# Patient Record
Sex: FEMALE | Race: BLACK OR AFRICAN AMERICAN | NOT HISPANIC OR LATINO
[De-identification: names, ages, dates, MRNs, and addresses within clinical notes are randomized per-mention and may not be internally consistent; named-entity substitution may affect disease eponyms.]

---

## 2017-02-06 ENCOUNTER — APPOINTMENT (OUTPATIENT)
Dept: OBGYN | Facility: CLINIC | Age: 42
End: 2017-02-06

## 2017-02-06 VITALS
DIASTOLIC BLOOD PRESSURE: 70 MMHG | HEIGHT: 63 IN | BODY MASS INDEX: 27.46 KG/M2 | WEIGHT: 155 LBS | SYSTOLIC BLOOD PRESSURE: 128 MMHG

## 2017-02-17 LAB — CYTOLOGY CVX/VAG DOC THIN PREP: NORMAL

## 2017-02-27 ENCOUNTER — APPOINTMENT (OUTPATIENT)
Dept: OBGYN | Facility: CLINIC | Age: 42
End: 2017-02-27

## 2017-02-27 VITALS — WEIGHT: 151 LBS | HEIGHT: 63 IN | BODY MASS INDEX: 26.75 KG/M2

## 2017-02-27 DIAGNOSIS — R87.612 LOW GRADE SQUAMOUS INTRAEPITHELIAL LESION ON CYTOLOGIC SMEAR OF CERVIX (LGSIL): ICD-10-CM

## 2017-03-03 LAB — CORE LAB BIOPSY: NORMAL

## 2017-10-26 DIAGNOSIS — Z00.00 ENCOUNTER FOR GENERAL ADULT MEDICAL EXAMINATION W/OUT ABNORMAL FINDINGS: ICD-10-CM

## 2017-10-30 ENCOUNTER — APPOINTMENT (OUTPATIENT)
Dept: OBGYN | Facility: CLINIC | Age: 42
End: 2017-10-30
Payer: COMMERCIAL

## 2017-10-30 DIAGNOSIS — N92.6 IRREGULAR MENSTRUATION, UNSPECIFIED: ICD-10-CM

## 2017-10-30 PROCEDURE — 99213 OFFICE O/P EST LOW 20 MIN: CPT | Mod: 25

## 2017-10-30 PROCEDURE — 36415 COLL VENOUS BLD VENIPUNCTURE: CPT

## 2017-10-30 PROCEDURE — 76830 TRANSVAGINAL US NON-OB: CPT

## 2017-10-31 ENCOUNTER — LABORATORY RESULT (OUTPATIENT)
Age: 42
End: 2017-10-31

## 2017-11-01 ENCOUNTER — APPOINTMENT (OUTPATIENT)
Dept: OBGYN | Facility: CLINIC | Age: 42
End: 2017-11-01
Payer: COMMERCIAL

## 2017-11-01 LAB
ABO + RH PNL BLD: NORMAL
C TRACH RRNA SPEC QL NAA+PROBE: NOT DETECTED
HCG SERPL QL: POSITIVE
N GONORRHOEA RRNA SPEC QL NAA+PROBE: NOT DETECTED
PAPP-A SERPL-ACNC: 271 MIU/ML
PROGEST SERPL-MCNC: 3.7 NG/ML
SOURCE AMPLIFICATION: NORMAL

## 2017-11-01 PROCEDURE — 36415 COLL VENOUS BLD VENIPUNCTURE: CPT

## 2017-11-02 ENCOUNTER — FORM ENCOUNTER (OUTPATIENT)
Age: 42
End: 2017-11-02

## 2017-11-02 LAB — PROGEST SERPL-MCNC: 6.9 NG/ML

## 2017-11-03 ENCOUNTER — APPOINTMENT (OUTPATIENT)
Dept: ULTRASOUND IMAGING | Facility: CLINIC | Age: 42
End: 2017-11-03
Payer: COMMERCIAL

## 2017-11-03 ENCOUNTER — OUTPATIENT (OUTPATIENT)
Dept: OUTPATIENT SERVICES | Facility: HOSPITAL | Age: 42
LOS: 1 days | End: 2017-11-03
Payer: COMMERCIAL

## 2017-11-03 DIAGNOSIS — Z00.8 ENCOUNTER FOR OTHER GENERAL EXAMINATION: ICD-10-CM

## 2017-11-03 PROCEDURE — 76830 TRANSVAGINAL US NON-OB: CPT | Mod: 26

## 2017-11-03 PROCEDURE — 76856 US EXAM PELVIC COMPLETE: CPT | Mod: 26

## 2017-11-03 PROCEDURE — 76830 TRANSVAGINAL US NON-OB: CPT

## 2017-11-03 PROCEDURE — 76856 US EXAM PELVIC COMPLETE: CPT

## 2017-11-07 ENCOUNTER — MOBILE ON CALL (OUTPATIENT)
Age: 42
End: 2017-11-07

## 2017-11-08 ENCOUNTER — APPOINTMENT (OUTPATIENT)
Dept: OBGYN | Facility: CLINIC | Age: 42
End: 2017-11-08

## 2017-11-09 ENCOUNTER — LABORATORY RESULT (OUTPATIENT)
Age: 42
End: 2017-11-09

## 2017-11-09 DIAGNOSIS — O00.90 UNSPECIFIED. ECTOPIC. PREGNANCY WITHOUT INTRAUTERINE PREGNANCY: ICD-10-CM

## 2017-11-09 LAB
HCG SERPL QL: POSITIVE
HCG SERPL QL: POSITIVE
PAPP-A SERPL-ACNC: 187 MIU/ML
PAPP-A SERPL-ACNC: 192 MIU/ML

## 2017-11-12 ENCOUNTER — MOBILE ON CALL (OUTPATIENT)
Age: 42
End: 2017-11-12

## 2017-11-15 ENCOUNTER — OTHER (OUTPATIENT)
Age: 42
End: 2017-11-15

## 2017-11-15 LAB
ALBUMIN SERPL ELPH-MCNC: 4.2 G/DL
ALP BLD-CCNC: 72 U/L
ALT SERPL-CCNC: 10 U/L
ANION GAP SERPL CALC-SCNC: 12 MMOL/L
AST SERPL-CCNC: 16 U/L
BASOPHILS # BLD AUTO: 0.02 K/UL
BASOPHILS NFR BLD AUTO: 0.2 %
BILIRUB SERPL-MCNC: 0.2 MG/DL
BUN SERPL-MCNC: 8 MG/DL
CALCIUM SERPL-MCNC: 9.8 MG/DL
CHLORIDE SERPL-SCNC: 99 MMOL/L
CO2 SERPL-SCNC: 24 MMOL/L
CREAT SERPL-MCNC: 0.88 MG/DL
EOSINOPHIL # BLD AUTO: 0.14 K/UL
EOSINOPHIL NFR BLD AUTO: 1.6 %
GLUCOSE SERPL-MCNC: 95 MG/DL
HCG SERPL QL: POSITIVE
HCT VFR BLD CALC: 36.8 %
HGB BLD-MCNC: 12.3 G/DL
IMM GRANULOCYTES NFR BLD AUTO: 0.2 %
LYMPHOCYTES # BLD AUTO: 3.65 K/UL
LYMPHOCYTES NFR BLD AUTO: 40.7 %
MAN DIFF?: NORMAL
MCHC RBC-ENTMCNC: 28 PG
MCHC RBC-ENTMCNC: 33.4 GM/DL
MCV RBC AUTO: 83.6 FL
MONOCYTES # BLD AUTO: 0.4 K/UL
MONOCYTES NFR BLD AUTO: 4.5 %
NEUTROPHILS # BLD AUTO: 4.73 K/UL
NEUTROPHILS NFR BLD AUTO: 52.8 %
PAPP-A SERPL-ACNC: 64 MIU/ML
PLATELET # BLD AUTO: 312 K/UL
POTASSIUM SERPL-SCNC: 3.8 MMOL/L
PROT SERPL-MCNC: 7.6 G/DL
RBC # BLD: 4.4 M/UL
RBC # FLD: 15.3 %
SODIUM SERPL-SCNC: 135 MMOL/L
WBC # FLD AUTO: 8.96 K/UL

## 2017-11-16 LAB
BASOPHILS # BLD AUTO: 0.02 K/UL
BASOPHILS NFR BLD AUTO: 0.3 %
EOSINOPHIL # BLD AUTO: 0.1 K/UL
EOSINOPHIL NFR BLD AUTO: 1.5 %
HCT VFR BLD CALC: 36.5 %
HGB BLD-MCNC: 12 G/DL
IMM GRANULOCYTES NFR BLD AUTO: 0.3 %
LYMPHOCYTES # BLD AUTO: 2.13 K/UL
LYMPHOCYTES NFR BLD AUTO: 32.8 %
MAN DIFF?: NORMAL
MCHC RBC-ENTMCNC: 27.7 PG
MCHC RBC-ENTMCNC: 32.9 GM/DL
MCV RBC AUTO: 84.3 FL
MONOCYTES # BLD AUTO: 0.38 K/UL
MONOCYTES NFR BLD AUTO: 5.9 %
NEUTROPHILS # BLD AUTO: 3.84 K/UL
NEUTROPHILS NFR BLD AUTO: 59.2 %
PLATELET # BLD AUTO: 305 K/UL
RBC # BLD: 4.33 M/UL
RBC # FLD: 15.4 %
WBC # FLD AUTO: 6.49 K/UL

## 2017-11-17 LAB
HCG SERPL QL: POSITIVE
PAPP-A SERPL-ACNC: 29 MIU/ML

## 2017-11-24 ENCOUNTER — MOBILE ON CALL (OUTPATIENT)
Age: 42
End: 2017-11-24

## 2017-11-27 LAB
BASOPHILS # BLD AUTO: 0.02 K/UL
BASOPHILS NFR BLD AUTO: 0.2 %
EOSINOPHIL # BLD AUTO: 0.17 K/UL
EOSINOPHIL NFR BLD AUTO: 1.9 %
HCG SERPL QL: NORMAL
HCT VFR BLD CALC: 38.3 %
HGB BLD-MCNC: 12.3 G/DL
IMM GRANULOCYTES NFR BLD AUTO: 0.3 %
LYMPHOCYTES # BLD AUTO: 2.88 K/UL
LYMPHOCYTES NFR BLD AUTO: 32.6 %
MAN DIFF?: NORMAL
MCHC RBC-ENTMCNC: 32.1 GM/DL
MCV RBC AUTO: 85.7 FL
MONOCYTES # BLD AUTO: 0.59 K/UL
MONOCYTES NFR BLD AUTO: 6.7 %
NEUTROPHILS # BLD AUTO: 5.14 K/UL
NEUTROPHILS NFR BLD AUTO: 58.3 %
PAPP-A SERPL-ACNC: 5 MIU/ML
RBC # FLD: 15.8 %
WBC # FLD AUTO: 8.83 K/UL

## 2019-04-22 ENCOUNTER — APPOINTMENT (OUTPATIENT)
Dept: OBGYN | Facility: CLINIC | Age: 44
End: 2019-04-22
Payer: COMMERCIAL

## 2019-04-22 VITALS
DIASTOLIC BLOOD PRESSURE: 80 MMHG | HEIGHT: 63 IN | WEIGHT: 161 LBS | SYSTOLIC BLOOD PRESSURE: 140 MMHG | BODY MASS INDEX: 28.53 KG/M2

## 2019-04-22 PROCEDURE — 99396 PREV VISIT EST AGE 40-64: CPT

## 2019-04-22 NOTE — PHYSICAL EXAM
[Awake] : awake [Acute Distress] : no acute distress [Alert] : alert [LAD] : no lymphadenopathy [Thyroid Nodule] : no thyroid nodule [Mass] : no breast mass [Goiter] : no goiter [Nipple Discharge] : no nipple discharge [Axillary LAD] : no axillary lymphadenopathy [Soft] : soft [Tender] : non tender [Oriented x3] : oriented to person, place, and time [Normal] : uterus [No Bleeding] : there was no active vaginal bleeding [Uterine Adnexae] : were not tender and not enlarged [RRR, No Murmurs] : RRR, no murmurs [CTAB] : CTAB

## 2019-04-22 NOTE — PROCEDURE
[Cervical Pap Smear] : cervical Pap smear [No Complications] : there were no complications [Liquid Base] : liquid base [Tolerated Well] : the patient tolerated the procedure well

## 2019-04-22 NOTE — HISTORY OF PRESENT ILLNESS
[Last Mammogram ___] : Last Mammogram was [unfilled] [Last Pap ___] : Last cervical pap smear was [unfilled] [Reproductive Age] : is of reproductive age [Definite:  ___ (Date)] : the last menstrual period was [unfilled]

## 2019-04-25 LAB — CYTOLOGY CVX/VAG DOC THIN PREP: NORMAL

## 2019-05-12 ENCOUNTER — MOBILE ON CALL (OUTPATIENT)
Age: 44
End: 2019-05-12

## 2019-05-13 LAB
DHEA-S SERPL-MCNC: 67.8 UG/DL
ESTRADIOL SERPL-MCNC: 49 PG/ML
FSH SERPL-MCNC: 6.7 IU/L
HCG SERPL QL: NEGATIVE
LH SERPL-ACNC: 7.2 IU/L
PAPP-A SERPL-ACNC: <1 MIU/ML
PROGEST SERPL-MCNC: 0.2 NG/ML
PROLACTIN SERPL-MCNC: 21.3 NG/ML
TESTOST SERPL-MCNC: 5.9 NG/DL
TSH SERPL-ACNC: 1.42 UIU/ML

## 2020-06-08 ENCOUNTER — APPOINTMENT (OUTPATIENT)
Dept: OBGYN | Facility: CLINIC | Age: 45
End: 2020-06-08

## 2020-09-14 ENCOUNTER — APPOINTMENT (OUTPATIENT)
Dept: OBGYN | Facility: CLINIC | Age: 45
End: 2020-09-14
Payer: COMMERCIAL

## 2020-09-14 VITALS
BODY MASS INDEX: 27.64 KG/M2 | TEMPERATURE: 97.8 F | DIASTOLIC BLOOD PRESSURE: 72 MMHG | WEIGHT: 156 LBS | HEIGHT: 63 IN | SYSTOLIC BLOOD PRESSURE: 140 MMHG

## 2020-09-14 DIAGNOSIS — Z01.419 ENCOUNTER FOR GYNECOLOGICAL EXAMINATION (GENERAL) (ROUTINE) W/OUT ABNORMAL FINDINGS: ICD-10-CM

## 2020-09-14 PROCEDURE — 99396 PREV VISIT EST AGE 40-64: CPT

## 2020-09-14 PROCEDURE — 81003 URINALYSIS AUTO W/O SCOPE: CPT | Mod: QW

## 2020-09-14 NOTE — PHYSICAL EXAM
[Appropriately responsive] : appropriately responsive [Alert] : alert [No Acute Distress] : no acute distress [Regular Rate Rhythm] : regular rate rhythm [No Lymphadenopathy] : no lymphadenopathy [Clear to Auscultation B/L] : clear to auscultation bilaterally [No Murmurs] : no murmurs [Non-tender] : non-tender [Soft] : soft [No HSM] : No HSM [Non-distended] : non-distended [No Mass] : no mass [No Lesions] : no lesions [Oriented x3] : oriented x3 [Examination Of The Breasts] : a normal appearance [No Masses] : no breast masses were palpable [Labia Minora] : normal [Labia Majora] : normal [Normal] : normal [Uterine Adnexae] : normal

## 2020-09-16 LAB — BACTERIA UR CULT: NORMAL

## 2020-09-21 LAB — CYTOLOGY CVX/VAG DOC THIN PREP: NORMAL

## 2020-10-28 ENCOUNTER — APPOINTMENT (OUTPATIENT)
Dept: UROLOGY | Facility: CLINIC | Age: 45
End: 2020-10-28

## 2020-12-22 ENCOUNTER — APPOINTMENT (OUTPATIENT)
Dept: UROLOGY | Facility: CLINIC | Age: 45
End: 2020-12-22

## 2021-10-04 ENCOUNTER — APPOINTMENT (OUTPATIENT)
Dept: OBGYN | Facility: CLINIC | Age: 46
End: 2021-10-04
Payer: COMMERCIAL

## 2021-10-04 VITALS
SYSTOLIC BLOOD PRESSURE: 148 MMHG | DIASTOLIC BLOOD PRESSURE: 86 MMHG | BODY MASS INDEX: 27.64 KG/M2 | WEIGHT: 156 LBS | HEIGHT: 63 IN

## 2021-10-04 PROCEDURE — 99396 PREV VISIT EST AGE 40-64: CPT

## 2021-10-04 NOTE — HISTORY OF PRESENT ILLNESS
[FreeTextEntry1] : patient presents today for routine annual exam.\par  [TextBox_4] : frequency and urgency  [Mammogramdate] : 10/2020 [BreastSonogramDate] : 10/2020 [PapSmeardate] : 9/2020 [LMPDate] : 9/25/2021

## 2021-10-11 LAB — CYTOLOGY CVX/VAG DOC THIN PREP: NORMAL

## 2022-04-01 ENCOUNTER — APPOINTMENT (OUTPATIENT)
Dept: UROLOGY | Facility: CLINIC | Age: 47
End: 2022-04-01
Payer: COMMERCIAL

## 2022-04-01 VITALS
SYSTOLIC BLOOD PRESSURE: 124 MMHG | HEIGHT: 63 IN | BODY MASS INDEX: 28.35 KG/M2 | WEIGHT: 160 LBS | HEART RATE: 85 BPM | DIASTOLIC BLOOD PRESSURE: 85 MMHG

## 2022-04-01 DIAGNOSIS — R39.15 URGENCY OF URINATION: ICD-10-CM

## 2022-04-01 DIAGNOSIS — R35.0 FREQUENCY OF MICTURITION: ICD-10-CM

## 2022-04-01 DIAGNOSIS — K59.09 OTHER CONSTIPATION: ICD-10-CM

## 2022-04-01 PROCEDURE — 51798 US URINE CAPACITY MEASURE: CPT

## 2022-04-01 PROCEDURE — 99204 OFFICE O/P NEW MOD 45 MIN: CPT

## 2022-04-01 RX ORDER — METHOTREXATE SODIUM 25 MG/ML
250 INJECTION, SOLUTION INTRA-ARTERIAL; INTRAMUSCULAR; INTRAVENOUS ONCE
Qty: 10 | Refills: 0 | Status: COMPLETED | COMMUNITY
Start: 2017-11-09 | End: 2022-04-01

## 2022-04-01 RX ORDER — AMLODIPINE BESYLATE 5 MG/1
5 TABLET ORAL
Refills: 0 | Status: COMPLETED | COMMUNITY
End: 2022-04-01

## 2022-04-01 RX ORDER — VITAMIN A, ASCORBIC ACID, CHOLECALCIFEROL, .ALPHA.-TOCOPHEROL ACETATE, DL-, THIAMINE MONONITRATE, RIBOFLAVIN, NIACINAMIDE, PYRIDOXINE HYDROCHLORIDE, FOLIC ACID, CYANOCOBALAMIN, CALCIUM CARBONATE, IRON, ZINC OXIDE, AND CUPRIC OXIDE 4000; 120; 400; 22; 1.84; 3; 20; 10; 1; 12; 200; 29; 25; 2 [IU]/1; MG/1; [IU]/1; [IU]/1; MG/1; MG/1; MG/1; MG/1; MG/1; UG/1; MG/1; MG/1; MG/1; MG/1
29-1 TABLET ORAL
Qty: 90 | Refills: 3 | Status: COMPLETED | COMMUNITY
Start: 2017-02-06 | End: 2022-04-01

## 2022-04-01 RX ORDER — METOPROLOL TARTRATE 25 MG/1
25 TABLET, FILM COATED ORAL
Refills: 0 | Status: ACTIVE | COMMUNITY

## 2022-04-01 NOTE — PHYSICAL EXAM
[General Appearance - Well Developed] : well developed [General Appearance - Well Nourished] : well nourished [Normal Appearance] : normal appearance [Well Groomed] : well groomed [General Appearance - In No Acute Distress] : no acute distress [Edema] : no peripheral edema [Respiration, Rhythm And Depth] : normal respiratory rhythm and effort [Exaggerated Use Of Accessory Muscles For Inspiration] : no accessory muscle use [Abdomen Soft] : soft [Abdomen Tenderness] : non-tender [Costovertebral Angle Tenderness] : no ~M costovertebral angle tenderness [Urinary Bladder Findings] : the bladder was normal on palpation [Normal Station and Gait] : the gait and station were normal for the patient's age [] : no rash [Oriented To Time, Place, And Person] : oriented to person, place, and time [Affect] : the affect was normal [Mood] : the mood was normal [Not Anxious] : not anxious

## 2022-04-05 LAB
APPEARANCE: ABNORMAL
BACTERIA UR CULT: NORMAL
BACTERIA: NEGATIVE
BILIRUBIN URINE: NEGATIVE
BLOOD URINE: NEGATIVE
COLOR: NORMAL
GLUCOSE QUALITATIVE U: NEGATIVE
HYALINE CASTS: 0 /LPF
KETONES URINE: NEGATIVE
LEUKOCYTE ESTERASE URINE: NEGATIVE
MICROSCOPIC-UA: NORMAL
NITRITE URINE: NEGATIVE
PH URINE: 8.5
PROTEIN URINE: NORMAL
RED BLOOD CELLS URINE: 2 /HPF
SPECIFIC GRAVITY URINE: 1.02
SQUAMOUS EPITHELIAL CELLS: 1 /HPF
UROBILINOGEN URINE: NORMAL
WHITE BLOOD CELLS URINE: 0 /HPF

## 2022-04-06 NOTE — HISTORY OF PRESENT ILLNESS
[FreeTextEntry1] : 47yo G 3 P 1  1,1 miscarriage , 1   woman with a year old hx of urinary frequency,urgency and naive to therapy. Voids with increased urgency and frequency< 1 hour post drinking of fluids. She thinks her bladder capacity has decreased since past year. Voids q 15-30 mins in 30 mins  after drinking water and nocturia X 2-3 if she drank fluids 2 -3 hours prior bedtime. If she did not drink fluids - she could hold for q 1 h . 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 Reports constipation for  5/7 days, takes prune juice PRN and Miralax if that does' not work. . Denies UTI, denies gross hematuria. Denies Kidney stones hx. No renal imaging on file.Denies stressors , was undergoing stress last year. PVR -0 ml \par

## 2022-04-06 NOTE — ASSESSMENT
[FreeTextEntry1] : 46  y.o woman with OAB- urinary frequency, urgency , naive to therapy / medications \par --Life style behavioral modifications reviewed - Fluids management - Drink 48 -64  oz of water as long as no medical contraindications, refrain from drinking fluids 3 hours before  bedtime. \par --Complete a voiding diary to assess fluid intake and voided volume as well as if any urinary leakage and type and amount.\par --Address constipation  with fiber intake - fruits and vegetable servings 3-5 per day, use of daily Gummy fibers supplements if needed .\par --UA microscopic analysis, Culture- call with results.\par --Trial of Oxybutynin ER 5  mg po daily and side effects reviewed. \par --RTO in 1  month \par

## 2022-05-06 ENCOUNTER — APPOINTMENT (OUTPATIENT)
Dept: UROLOGY | Facility: CLINIC | Age: 47
End: 2022-05-06

## 2022-08-02 ENCOUNTER — APPOINTMENT (OUTPATIENT)
Dept: OBGYN | Facility: CLINIC | Age: 47
End: 2022-08-02

## 2022-08-02 VITALS — BODY MASS INDEX: 27.99 KG/M2 | DIASTOLIC BLOOD PRESSURE: 90 MMHG | WEIGHT: 158 LBS | SYSTOLIC BLOOD PRESSURE: 130 MMHG

## 2022-08-02 DIAGNOSIS — K31.84 GASTROPARESIS: ICD-10-CM

## 2022-08-02 DIAGNOSIS — Z83.3 FAMILY HISTORY OF DIABETES MELLITUS: ICD-10-CM

## 2022-08-02 DIAGNOSIS — Z78.9 OTHER SPECIFIED HEALTH STATUS: ICD-10-CM

## 2022-08-02 DIAGNOSIS — Z87.19 PERSONAL HISTORY OF OTHER DISEASES OF THE DIGESTIVE SYSTEM: ICD-10-CM

## 2022-08-02 DIAGNOSIS — Z86.79 PERSONAL HISTORY OF OTHER DISEASES OF THE CIRCULATORY SYSTEM: ICD-10-CM

## 2022-08-02 PROCEDURE — 81025 URINE PREGNANCY TEST: CPT

## 2022-08-02 PROCEDURE — 99213 OFFICE O/P EST LOW 20 MIN: CPT | Mod: 25

## 2022-08-02 PROCEDURE — 58100 BIOPSY OF UTERUS LINING: CPT

## 2022-08-02 RX ORDER — MULTIVITAMIN
TABLET ORAL
Refills: 0 | Status: DISCONTINUED | COMMUNITY
End: 2022-08-02

## 2022-08-02 RX ORDER — OXYBUTYNIN CHLORIDE 5 MG/1
5 TABLET, EXTENDED RELEASE ORAL DAILY
Qty: 30 | Refills: 3 | Status: DISCONTINUED | COMMUNITY
Start: 2022-04-01 | End: 2022-08-02

## 2022-08-04 LAB — HCG UR QL: NEGATIVE

## 2022-10-17 ENCOUNTER — APPOINTMENT (OUTPATIENT)
Dept: ORTHOPEDIC SURGERY | Facility: CLINIC | Age: 47
End: 2022-10-17

## 2022-10-17 VITALS — HEIGHT: 63 IN | WEIGHT: 158 LBS | BODY MASS INDEX: 28 KG/M2

## 2022-10-17 DIAGNOSIS — Z78.9 OTHER SPECIFIED HEALTH STATUS: ICD-10-CM

## 2022-10-17 PROCEDURE — 99203 OFFICE O/P NEW LOW 30 MIN: CPT

## 2022-10-17 PROCEDURE — 72050 X-RAY EXAM NECK SPINE 4/5VWS: CPT
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
## 2022-10-17 NOTE — IMAGING
[Straightening consistent with spasm] : Straightening consistent with spasm [Disc space narrowing] : Disc space narrowing [FreeTextEntry1] : 5/6, 6/7 with anterior spurs

## 2022-10-17 NOTE — PHYSICAL EXAM
[] : paracervical tenderness [Flexion] : flexion [Rotation to right] : rotation to right [de-identified] : 4+/5 bilaterally

## 2022-10-17 NOTE — ASSESSMENT
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 Will get updated mri for further evaluation. \par In the interim recommend MDP. \par BHARAT vs surgical intervention pending MRI

## 2022-10-20 ENCOUNTER — APPOINTMENT (OUTPATIENT)
Dept: MRI IMAGING | Facility: CLINIC | Age: 47
End: 2022-10-20

## 2022-10-20 PROCEDURE — 72141 MRI NECK SPINE W/O DYE: CPT

## 2022-10-27 ENCOUNTER — APPOINTMENT (OUTPATIENT)
Dept: ORTHOPEDIC SURGERY | Facility: CLINIC | Age: 47
End: 2022-10-27

## 2022-10-31 ENCOUNTER — APPOINTMENT (OUTPATIENT)
Dept: ORTHOPEDIC SURGERY | Facility: CLINIC | Age: 47
End: 2022-10-31

## 2022-10-31 PROCEDURE — 99213 OFFICE O/P EST LOW 20 MIN: CPT

## 2022-10-31 NOTE — ASSESSMENT
[FreeTextEntry1] : 48 y/o F with h/o C6/7 HNP with worsening symptoms. With C6-7 Right HNP\par Pain management referral\par If pain persist would consider C6-7  ACDF
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
## 2022-10-31 NOTE — IMAGING
[Straightening consistent with spasm] : Straightening consistent with spasm [Disc space narrowing] : Disc space narrowing [de-identified] : ALEX cervical spine reviewed.  Stable C6-7 disc herniation worse on right.  [FreeTextEntry1] : 5/6, 6/7 with anterior spurs

## 2022-10-31 NOTE — PHYSICAL EXAM
[] : paracervical tenderness [Flexion] : flexion [Rotation to right] : rotation to right [de-identified] : 4+/5 bilaterally

## 2023-05-18 ENCOUNTER — EMERGENCY (EMERGENCY)
Facility: HOSPITAL | Age: 48
LOS: 0 days | Discharge: HOME | End: 2023-05-18
Attending: EMERGENCY MEDICINE
Payer: COMMERCIAL

## 2023-05-18 VITALS
OXYGEN SATURATION: 99 % | TEMPERATURE: 99 F | WEIGHT: 154.98 LBS | HEART RATE: 83 BPM | DIASTOLIC BLOOD PRESSURE: 100 MMHG | HEIGHT: 63 IN | SYSTOLIC BLOOD PRESSURE: 164 MMHG | RESPIRATION RATE: 20 BRPM
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
## 2023-05-18 DIAGNOSIS — I10 ESSENTIAL (PRIMARY) HYPERTENSION: ICD-10-CM

## 2023-05-18 DIAGNOSIS — S09.90XA UNSPECIFIED INJURY OF HEAD, INITIAL ENCOUNTER: ICD-10-CM
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
## 2023-05-18 DIAGNOSIS — G44.319 ACUTE POST-TRAUMATIC HEADACHE, NOT INTRACTABLE: ICD-10-CM

## 2023-05-18 DIAGNOSIS — R11.0 NAUSEA: ICD-10-CM

## 2023-05-18 PROCEDURE — 70450 CT HEAD/BRAIN W/O DYE: CPT | Mod: MA

## 2023-05-18 PROCEDURE — 70450 CT HEAD/BRAIN W/O DYE: CPT | Mod: 26,MA

## 2023-05-18 PROCEDURE — 99284 EMERGENCY DEPT VISIT MOD MDM: CPT | Mod: 25

## 2023-05-18 PROCEDURE — 99285 EMERGENCY DEPT VISIT HI MDM: CPT

## 2023-05-18 PROCEDURE — 72125 CT NECK SPINE W/O DYE: CPT | Mod: 26,MA

## 2023-05-18 PROCEDURE — 72125 CT NECK SPINE W/O DYE: CPT | Mod: MA

## 2023-05-18 RX ORDER — DIAZEPAM 5 MG
5 TABLET ORAL ONCE
Refills: 0 | Status: DISCONTINUED | OUTPATIENT
Start: 2023-05-18 | End: 2023-05-18

## 2023-05-18 RX ORDER — ONDANSETRON 8 MG/1
4 TABLET, FILM COATED ORAL ONCE
Refills: 0 | Status: DISCONTINUED | OUTPATIENT
Start: 2023-05-18 | End: 2023-05-18

## 2023-05-18 RX ORDER — ACETAMINOPHEN 500 MG
650 TABLET ORAL ONCE
Refills: 0 | Status: COMPLETED | OUTPATIENT
Start: 2023-05-18 | End: 2023-05-18

## 2023-05-18 RX ORDER — IBUPROFEN 200 MG
600 TABLET ORAL ONCE
Refills: 0 | Status: COMPLETED | OUTPATIENT
Start: 2023-05-18 | End: 2023-05-18

## 2023-05-18 RX ORDER — ONDANSETRON 8 MG/1
4 TABLET, FILM COATED ORAL ONCE
Refills: 0 | Status: COMPLETED | OUTPATIENT
Start: 2023-05-18 | End: 2023-05-18

## 2023-05-18 RX ADMIN — Medication 600 MILLIGRAM(S): at 09:48

## 2023-05-18 RX ADMIN — ONDANSETRON 4 MILLIGRAM(S): 8 TABLET, FILM COATED ORAL at 08:20

## 2023-05-18 RX ADMIN — Medication 5 MILLIGRAM(S): at 08:20

## 2023-05-18 RX ADMIN — Medication 650 MILLIGRAM(S): at 08:19

## 2023-05-18 NOTE — ED PROVIDER NOTE - NSFOLLOWUPINSTRUCTIONS_ED_ALL_ED_FT
-You were seen in the Emergency Department (ED) for fall. Lab and imaging results, if performed, were discussed with you along with your discharge diagnosis.    FOLLOW-UP:  -Please follow up with your PMD if symptoms return or for any concerning matter pertaining to your fall.  -Please follow up with your private physician within the next 72 hours. Tell them you were recently in the ED for an urgent issue and would like to be seen. Bring copies of your results if you were given.   -If you do not have a PMD, please call [phone] to find one convenient for you or call our clinic at [phone].    MEDICATIONS:  -Continue all other prescribed medicine, IF ANY, as per your primary care doctor's (PMD) recommendations.    PAIN CONTROL:  -Please take over the counter Tylenol (also known as acetaminophen) 650mg every 6 hours or Ibuprofen (also known as motrin, advil) 600mg every 8 hour for your pain, IF ANY, unless you are not supposed to for any reason.  -Rest, stay hydrated with plenty of fluids (drink at least 2 Liters or 64 Ounces of water each day UNLESS you are supposed to restrict fluids or ANY reason.    RETURN PRECAUTIONS:  -Please return to the Emergency Department if you experience ANY new or concerning symptoms, such as, but not limited to: worsening pain, large amount of bleeding, passing out, fever >100.F, shaking chills, inability to see or new double vision, chest pain, difficulty breathing, diffuse abdominal pain, unable to eat or drink, continuous vomiting or diarrhea, unable to move or feel part of your body

## 2023-05-18 NOTE — ED PROVIDER NOTE - PATIENT PORTAL LINK FT
You can access the FollowMyHealth Patient Portal offered by [facility] by registering at the following website: [email or web address]. By joining [facility]’s FollowMyHealth portal, you will also be able to view your health information using other applications (apps) compatible with our system.

## 2023-05-18 NOTE — ED PROVIDER NOTE - CLINICAL SUMMARY MEDICAL DECISION MAKING FREE TEXT BOX
47-year-old female with a past medical history of HTN presents to the ED status post mechanical trip and fall from a ground level. Currently endorsing headache at bedside without nausea or vomiting.  No LOC.  Initial vitals reviewed and otherwise nonactionable.  Afebrile satting 100% on room air.  Blood pressure 160 systolic, mildly hypertensive otherwise nonactionable.  Physical exam as noted above.  Otherwise well-appearing female without any acute distress.  Head is atraumatic and normocephalic.  No midline spinal tenderness throughout the cervical thoracic and lumbar spine.  No gross deformities noted.  Able to move all extremities without difficulty with 5 out of 5 strength.  Abdomen is soft nontender without guarding or rebound tenderness.  Differential diagnosis includes but not limited to traumatic injuries.  Will order meds, imaging, and reassess. 47-year-old female with a past medical history of HTN presents to the ED status post mechanical trip and fall from a ground level. Currently endorsing headache at bedside without nausea or vomiting.  No LOC.  Initial vitals reviewed and otherwise nonactionable.  Afebrile satting 100% on room air.  Blood pressure 160 systolic, mildly hypertensive otherwise nonactionable.  Physical exam as noted above.  Otherwise well-appearing female without any acute distress.  Head is atraumatic and normocephalic.  No midline spinal tenderness throughout the cervical thoracic and lumbar spine.  No gross deformities noted.  Able to move all extremities without difficulty with 5 out of 5 strength.  Abdomen is soft nontender without guarding or rebound tenderness.  Differential diagnosis includes but not limited to traumatic injuries.  Will order meds, imaging, and reassess.    Dr. Barone: I performed a face to face bedside interview with patient regarding history of present illness, review of symptoms and past medical history. I completed an independent physical exam.  I have discussed patient's plan of care with resident.   I agree with note as stated above, having amended the EMR as needed to reflect my findings.   This includes HISTORY OF PRESENT ILLNESS, HIV, PAST MEDICAL/SURGICAL/FAMILY/SOCIAL HISTORY, ALLERGIES AND HOME MEDICATIONS, REVIEW OF SYSTEMS, PHYSICAL EXAM, and any PROGRESS NOTES during the time I functioned as the attending physician for this patient.
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
## 2023-05-18 NOTE — ED PROVIDER NOTE - OBJECTIVE STATEMENT
47-year-old female with a past medical history of HTN presents to the ED status post mechanical trip and fall from a ground level.  Patient denies any LOC.  No prodromal symptoms to precipitate the fall.  Usual state of health prior to fall today.  She endorses headache that started post fall.  Headache is described as a pressure-like sensation rated as moderate in severity.  She endorses mild nausea without vomiting.  Remembers everything leading up to the event, was able to ambulate after the fall.  No seizures.  She denies any chest pain, shortness of breath, abdominal pain, urinary symptoms, flank pain.  She denies any other symptoms at bedside.

## 2023-05-18 NOTE — ED PROVIDER NOTE - ATTENDING APP SHARED VISIT CONTRIBUTION OF CARE
Dr. Barone: I performed a face to face bedside interview with patient regarding history of present illness, review of symptoms and past medical history. I completed an independent physical exam.  I have discussed patient's plan of care with PA.   I agree with note as stated above, having amended the EMR as needed to reflect my findings.   This includes HISTORY OF PRESENT ILLNESS, HIV, PAST MEDICAL/SURGICAL/FAMILY/SOCIAL HISTORY, ALLERGIES AND HOME MEDICATIONS, REVIEW OF SYSTEMS, PHYSICAL EXAM, and any PROGRESS NOTES during the time I functioned as the attending physician for this patient.  ERICKA Barone DO

## 2023-05-18 NOTE — ED PROVIDER NOTE - PROGRESS NOTE DETAILS
Patient reassessed at this time, symptoms improved from initial evaluation.  CT reviewed and otherwise nonactionable.  There is no intracranial pathology noted.  Given the timeframe of fall to CT scan was less than 6 hours, there is no evidence of subarachnoid hemorrhage.  Patient instructed follow-up with PMD regarding further evaluation and management.  Strict return precautions related patient.  All questions were answered.
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
## 2023-05-18 NOTE — ED ADULT TRIAGE NOTE - TEMPERATURE IN CELSIUS (DEGREES C)

## 2023-05-18 NOTE — ED PROVIDER NOTE - ATTENDING CONTRIBUTION TO CARE
Dr. Barone: I performed a face to face bedside interview with patient regarding history of present illness, review of symptoms and past medical history. I completed an independent physical exam.  I have discussed patient's plan of care with res.   I agree with note as stated above, having amended the EMR as needed to reflect my findings.   This includes HISTORY OF PRESENT ILLNESS, HIV, PAST MEDICAL/SURGICAL/FAMILY/SOCIAL HISTORY, ALLERGIES AND HOME MEDICATIONS, REVIEW OF SYSTEMS, PHYSICAL EXAM, and any PROGRESS NOTES during the time I functioned as the attending physician for this patient.   Gen:  Well appearning in NAD  Head:  NC/AT  Resp: No distress   Ext: no deformities  Skin: warm and dry as visualized  ERICKA Barone DO Dr. Barone: I performed a face to face bedside interview with patient regarding history of present illness, review of symptoms and past medical history. I completed an independent physical exam.  I have discussed patient's plan of care with resident.   I agree with note as stated above, having amended the EMR as needed to reflect my findings.   This includes HISTORY OF PRESENT ILLNESS, HIV, PAST MEDICAL/SURGICAL/FAMILY/SOCIAL HISTORY, ALLERGIES AND HOME MEDICATIONS, REVIEW OF SYSTEMS, PHYSICAL EXAM, and any PROGRESS NOTES during the time I functioned as the attending physician for this patient.   Gen:  Well appearning in NAD  Head:  NC/AT  Resp: No distress   Ext: no deformities  Skin: warm and dry as visualized  ERICKA Barone DO

## 2023-05-18 NOTE — ED ADULT NURSE NOTE - OBJECTIVE STATEMENT
pt presents to ed s/p mechanical fall outside of a store with head strike and no LOC. no other complaints

## 2023-05-18 NOTE — ED PROVIDER NOTE - NS ED ATTENDING STATEMENT MOD
I have seen and examined this patient and fully participated in the care of this patient as the teaching attending.  The service was shared with the ARIE.  I reviewed and verified the documentation and independently performed the documented: This was a shared visit with the ARIE. I reviewed and verified the documentation and independently performed the documented:

## 2023-05-22 PROBLEM — I10 ESSENTIAL (PRIMARY) HYPERTENSION: Chronic | Status: ACTIVE | Noted: 2023-05-18

## 2023-05-23 ENCOUNTER — APPOINTMENT (OUTPATIENT)
Dept: ORTHOPEDIC SURGERY | Facility: CLINIC | Age: 48
End: 2023-05-23
Payer: COMMERCIAL

## 2023-05-23 VITALS — HEIGHT: 63 IN | WEIGHT: 158 LBS | BODY MASS INDEX: 28 KG/M2

## 2023-05-23 DIAGNOSIS — S46.011A STRAIN OF MUSCLE(S) AND TENDON(S) OF THE ROTATOR CUFF OF RIGHT SHOULDER, INITIAL ENCOUNTER: ICD-10-CM

## 2023-05-23 DIAGNOSIS — M79.18 MYALGIA, OTHER SITE: ICD-10-CM

## 2023-05-23 PROCEDURE — 99214 OFFICE O/P EST MOD 30 MIN: CPT

## 2023-05-23 PROCEDURE — 72040 X-RAY EXAM NECK SPINE 2-3 VW: CPT

## 2023-05-23 PROCEDURE — 73030 X-RAY EXAM OF SHOULDER: CPT | Mod: 50

## 2023-05-23 PROCEDURE — 73010 X-RAY EXAM OF SHOULDER BLADE: CPT | Mod: 50

## 2023-05-23 NOTE — IMAGING
[de-identified] : NECK:\par Inspection: no ecchymosis. \par Palpation: trapezial tenderness. \par Range of motion:  Full range of motion with mild stiffness . Pain at extremes of rotation to right. \par Strength Testing: Weakness with Right Finger Abductors and Grasp\par Normal Deltoid, Biceps, Triceps, Wrist Flexors\par Neurological testing: light touch is intact throughout both upper extremities\par Solis reflex: neg\par Spurling test: positive\par \par \par \par RIGHT  SHOULDER\par Inspection: No swelling. \par Palpation: Tenderness is noted at the bicipital groove, anterior and lateral. \par Range of motion: There is pain with range of motion.\par , ER 55, @90ER 90, @90IR 30\par Strength: There is pain, weakness, and discomfort with strength testing.\par Forward Flexion 3/5. Abduction 3/5.  External Rotation 4/5 and Internal Rotation 5-/5 \par Neurological testings: motor and sensor intact distally.\par Ligament Stability and Special Tests: \par There is positive arc of pain. \par Shoulder apprehension: neg\par Shoulder relocation: neg\par Dumont’s test: pos\par Biceps Active test: neg\par Delgado Labral Shear: neg\par Impingement testing: pos\par Ivet testing: pos\par Whipple: pos\par Cross Body Adduction: neg\par \par LEFT SHOULDER\par Inspection: No swelling. \par Palpation: Tenderness is noted at the bicipital groove, anterior and lateral. \par Range of motion: There is pain with range of motion.\par , ER 55, @90ER 90, @90IR 30\par Strength: There is pain and discomfort with strength testing.\par Forward Flexion 4/5. Abduction 4/5.  External Rotation 5-/5 and Internal Rotation 5/5 \par Neurological testings: motor and sensor intact distally.\par Ligament Stability and Special Tests: \par There is positive arc of pain. \par Shoulder apprehension: neg\par Shoulder relocation: neg\par Dumont’s test: pos\par Biceps Active test: neg\par Delgado Labral Shear: neg\par Impingement testing: pos\par Ivet testing: pos\par Whipple: pos\par Cross Body Adduction: neg\par \par

## 2023-05-23 NOTE — HISTORY OF PRESENT ILLNESS
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

## 2023-05-23 NOTE — ASSESSMENT
[FreeTextEntry1] :  Bilateral X-Ray Examination of the SHOULDER (2 views):  no fractures, subluxations or dislocations.  quest calc deposdit vs small fx\par X-Ray Examination of the SCAPULA 1 or 2 views shows: no significant abnormalities\par X-Ray Examination of the CERVICAL SPINE 3 views (or less) shows: straightening consistent with spasm and disc space narrowing. \par \par \par - The patient was advised of the diagnosis.  The natural history of the pathology was explained to the patient in layman's terms.  Several different treatment options were discussed and explained including the risks and benefits of both surgical and non-surgical treatments.\par - We will conservative treatment with a course of PT and anti-inflammatory medication.\par - Rx given for Medrol dose pack\par - Discussed the possible side effects of medication along with the timing and frequency for taking.\par - MRI to rule out HNP and other causes of cervical radiculopathy\par - right shoulde mri eval for occuylt fx vs glenn density and any tears\par - Follow up after mri with me for shoulder and [facility] for neck\par

## 2023-05-24 ENCOUNTER — APPOINTMENT (OUTPATIENT)
Dept: ORTHOPEDIC SURGERY | Facility: CLINIC | Age: 48
End: 2023-05-24

## 2023-05-25 ENCOUNTER — APPOINTMENT (OUTPATIENT)
Dept: PAIN MANAGEMENT | Facility: CLINIC | Age: 48
End: 2023-05-25

## 2023-05-25 ENCOUNTER — FORM ENCOUNTER (OUTPATIENT)
Age: 48
End: 2023-05-25

## 2023-05-26 ENCOUNTER — APPOINTMENT (OUTPATIENT)
Dept: MRI IMAGING | Facility: CLINIC | Age: 48
End: 2023-05-26
Payer: COMMERCIAL

## 2023-05-26 PROCEDURE — 73221 MRI JOINT UPR EXTREM W/O DYE: CPT | Mod: RT

## 2023-05-26 PROCEDURE — 72141 MRI NECK SPINE W/O DYE: CPT

## 2023-06-07 PROBLEM — M75.51 SUBACROMIAL BURSITIS OF RIGHT SHOULDER JOINT: Status: ACTIVE | Noted: 2023-06-07

## 2023-06-07 PROBLEM — M75.41 IMPINGEMENT SYNDROME OF RIGHT SHOULDER: Status: ACTIVE | Noted: 2023-06-07

## 2023-06-07 PROBLEM — S43.431A TEAR OF RIGHT GLENOID LABRUM, INITIAL ENCOUNTER: Status: ACTIVE | Noted: 2023-06-07

## 2023-06-07 PROBLEM — M75.111 INCOMPLETE TEAR OF RIGHT ROTATOR CUFF, UNSPECIFIED WHETHER TRAUMATIC: Status: ACTIVE | Noted: 2023-06-07

## 2023-06-07 NOTE — DATA REVIEWED
[MRI] : MRI [Right] : of the right [Shoulder] : shoulder [Cervical Spine] : cervical spine [I independently reviewed and interpreted images and report] : I independently reviewed and interpreted images and report

## 2023-06-08 ENCOUNTER — APPOINTMENT (OUTPATIENT)
Dept: ORTHOPEDIC SURGERY | Facility: CLINIC | Age: 48
End: 2023-06-08
Payer: COMMERCIAL

## 2023-06-08 VITALS — WEIGHT: 158 LBS | BODY MASS INDEX: 28 KG/M2 | HEIGHT: 63 IN

## 2023-06-08 DIAGNOSIS — S43.431A SUPERIOR GLENOID LABRUM LESION OF RIGHT SHOULDER, INITIAL ENCOUNTER: ICD-10-CM

## 2023-06-08 DIAGNOSIS — M75.51 BURSITIS OF RIGHT SHOULDER: ICD-10-CM

## 2023-06-08 DIAGNOSIS — M75.41 IMPINGEMENT SYNDROME OF RIGHT SHOULDER: ICD-10-CM

## 2023-06-08 DIAGNOSIS — M75.111 INCOMPLETE ROTATOR CUFF TEAR OR RUPTURE OF RIGHT SHOULDER, NOT SPECIFIED AS TRAUMATIC: ICD-10-CM

## 2023-06-08 PROCEDURE — 99214 OFFICE O/P EST MOD 30 MIN: CPT

## 2023-06-08 NOTE — HISTORY OF PRESENT ILLNESS
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

## 2023-06-08 NOTE — IMAGING
[de-identified] : NECK:\par Inspection: no ecchymosis. \par Palpation: trapezial tenderness. \par Range of motion:  Full range of motion with mild stiffness . Pain at extremes of rotation to right. \par Strength Testing: Weakness with Right Finger Abductors and Grasp\par Normal Deltoid, Biceps, Triceps, Wrist Flexors\par Neurological testing: light touch is intact throughout both upper extremities\par Solis reflex: neg\par Spurling test: positive\par \par \par \par RIGHT  SHOULDER\par Inspection: No swelling. \par Palpation: Tenderness is noted at the bicipital groove, anterior and lateral. \par Range of motion: There is pain with range of motion.\par , ER 55, @90ER 90, @90IR 30\par Strength: There is pain, weakness, and discomfort with strength testing.\par Forward Flexion 3/5. Abduction 3/5.  External Rotation 4/5 and Internal Rotation 5-/5 \par Neurological testings: motor and sensor intact distally.\par Ligament Stability and Special Tests: \par There is positive arc of pain. \par Shoulder apprehension: neg\par Shoulder relocation: neg\par Dumont’s test: pos\par Biceps Active test: neg\par Delgado Labral Shear: neg\par Impingement testing: pos\par Ivet testing: pos\par Whipple: pos\par Cross Body Adduction: neg\par \par LEFT SHOULDER\par Inspection: No swelling. \par Palpation: Tenderness is noted at the bicipital groove, anterior and lateral. \par Range of motion: There is pain with range of motion.\par , ER 55, @90ER 90, @90IR 30\par Strength: There is pain and discomfort with strength testing.\par Forward Flexion 4/5. Abduction 4/5.  External Rotation 5-/5 and Internal Rotation 5/5 \par Neurological testings: motor and sensor intact distally.\par Ligament Stability and Special Tests: \par There is positive arc of pain. \par Shoulder apprehension: neg\par Shoulder relocation: neg\par Dumont’s test: pos\par Biceps Active test: neg\par Delgado Labral Shear: neg\par Impingement testing: pos\par Ivet testing: pos\par Whipple: pos\par Cross Body Adduction: neg\par \par

## 2023-06-08 NOTE — ASSESSMENT
[FreeTextEntry1] : mri c spine 5/26/23 - mult HNP and bulges worst at R C6-7 with severe stenosis\par mri right shoulder 5/26/23 - rtc tendinopathy with fraying, labral tearing, bicep tendonits, bursitis, no fx\par \par \par \par - The patient was advised of the diagnosis.  The natural history of the pathology was explained to the patient in layman's terms.  Several different treatment options were discussed and explained including the risks and benefits of both surgical and non-surgical treatments.\par - We will conservative treatment with a course of PT and anti-inflammatory medication.\par - The patient was advised to let pain guide the gradual advancement of activities.\par - discussed given worsening numbness in hand and severe stenosis with large HNP she is prob candidate for surgery with acdf and will have fu with Dr. Tolentino to further discuss\par

## 2023-06-09 ENCOUNTER — APPOINTMENT (OUTPATIENT)
Dept: ORTHOPEDIC SURGERY | Facility: CLINIC | Age: 48
End: 2023-06-09
Payer: COMMERCIAL

## 2023-06-09 VITALS — BODY MASS INDEX: 28 KG/M2 | WEIGHT: 158 LBS | HEIGHT: 63 IN

## 2023-06-09 PROCEDURE — 99213 OFFICE O/P EST LOW 20 MIN: CPT

## 2023-06-09 NOTE — ASSESSMENT
[FreeTextEntry1] : 47 F with cervical HNP and RUE Radic as well as ow back pain\par PT for neck and low back\par Pain management referral for possible BHARAT in neck given severity of symptoms and not responding to MDP.\par FU 6 weeks

## 2023-06-09 NOTE — PHYSICAL EXAM
[] : paracervical tenderness [Flexion] : flexion [Rotation to right] : rotation to right [de-identified] : 4+/5 bilaterally Post-Care Instructions: I reviewed with the patient in detail post-care instructions. The pressure dressing is to be removed in 48 hours, leaving the brown paper tape exposed. Patient is not to engage in any heavy lifting, exercise, or swimming for the next 14 days. Should the patient develop any fevers, chills, bleeding, or severe pain, patient will contact the office immediately.

## 2023-06-09 NOTE — IMAGING
[de-identified] : MRI cervical spine reviewed.  1. Straightening of lordosis. Diffuse loss of disc signal and height with anterior spurring and no fracture. This is \par stable from the prior study.\par 2. C2-C3: Central herniation and annular fissure contacting but not compressing the anterior cord. The\par herniation is larger than on prior study.\par 3. C3-C4: Broad bulge with subtle central herniation. This is stable from the prior study.\par 4. C4-C5: Broad bulge with central herniation. Luschka hypertrophy and facet hypertrophy with inferior \par foraminal stenosis on the right. This is not significantly changed from the prior study.\par 5. C5-C6: Broad bulge. Luschka hypertrophy with mild right foraminal stenosis. The prior noted asymmetric to \par right herniation is not well appreciated on the current exam.\par 6. C6-C7: Broad bulge. Broad asymmetric to right herniation extending into the right foramen with Luschka \par hypertrophy and moderate-to-severe right foraminal stenosis. This is not significantly changed from the prior \par study.

## 2023-06-09 NOTE — HISTORY OF PRESENT ILLNESS
[Neck] : neck [de-identified] : 06/09/2023: Patient presents today for a new consult visit of the C-spine. States she fell on 5.18.23. Was seeing Dr Arec who did xrays/mri. Had known history of cervical degen changers iwht HNP.  Was treated with PT and was doing well until this fall.  Today has pain in neck radiating down arm.  HAs not restarted PT for neck.  Tried  from [place] with no relief.  [] : Post Surgical Visit: no

## 2023-06-12 ENCOUNTER — APPOINTMENT (OUTPATIENT)
Dept: ORTHOPEDIC SURGERY | Facility: CLINIC | Age: 48
End: 2023-06-12

## 2023-06-14 ENCOUNTER — NON-APPOINTMENT (OUTPATIENT)
Age: 48
End: 2023-06-14

## 2023-06-14 ENCOUNTER — APPOINTMENT (OUTPATIENT)
Dept: ORTHOPEDIC SURGERY | Facility: CLINIC | Age: 48
End: 2023-06-14
Payer: COMMERCIAL

## 2023-06-14 VITALS — BODY MASS INDEX: 27.64 KG/M2 | HEIGHT: 63 IN | WEIGHT: 156 LBS

## 2023-06-14 PROCEDURE — J3490M: CUSTOM

## 2023-06-14 PROCEDURE — 73564 X-RAY EXAM KNEE 4 OR MORE: CPT | Mod: RT

## 2023-06-14 PROCEDURE — 20610 DRAIN/INJ JOINT/BURSA W/O US: CPT

## 2023-06-14 PROCEDURE — 99214 OFFICE O/P EST MOD 30 MIN: CPT | Mod: 25

## 2023-06-14 NOTE — ASSESSMENT
[FreeTextEntry1] : Right X-Ray Examination of the KNEE (4 views): medial and patellofemoral degenerate changes.\par \par - We discussed their diagnosis and treatment options at length including the risks and benefits of both surgical treatment with a knee replacement and non-surgical options.\par - We will continue conservative treatment with activity modification, PT, icing, weight loss, and anti-inflammatory medications.\par - The patient was provided with a PT prescription to work on ROM, hip ER/abductors strengthening, quad/hamstring stretches and strengthening, and other exercises \par - The patient was advised to let pain guide the gradual advancement of activities. \par - We also discussed the possible of a corticosteroid injection in order to help decrease inflammation and pain so that they can perform better therapy.\par - The risks, benefits, and alternatives to corticosteroid injection were reviewed with the patient and they wished to proceed with this treatment course. \par - Follow up as needed in 6 weeks to re-evaluate, if no improvement we spoke about possibility of viscosupplementation injections\par - If continued symptoms, MRI to r/o MMT \par

## 2023-06-14 NOTE — IMAGING

## 2023-06-14 NOTE — HISTORY OF PRESENT ILLNESS
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

## 2023-06-15 ENCOUNTER — NON-APPOINTMENT (OUTPATIENT)
Age: 48
End: 2023-06-15

## 2023-06-20 ENCOUNTER — APPOINTMENT (OUTPATIENT)
Dept: ORTHOPEDIC SURGERY | Facility: CLINIC | Age: 48
End: 2023-06-20

## 2023-07-13 ENCOUNTER — APPOINTMENT (OUTPATIENT)
Dept: PAIN MANAGEMENT | Facility: CLINIC | Age: 48
End: 2023-07-13
Payer: COMMERCIAL

## 2023-07-13 VITALS — HEIGHT: 63 IN | BODY MASS INDEX: 28 KG/M2 | WEIGHT: 158 LBS

## 2023-07-13 PROCEDURE — 99204 OFFICE O/P NEW MOD 45 MIN: CPT

## 2023-07-13 NOTE — PHYSICAL EXAM
[] : paracervical tenderness [Rotation to left] : rotation to left [Rotation to right] : rotation to right [4___] : right deltoid  4[unfilled]/5 [FreeTextEntry8] : right worse left  [de-identified] : left lateral rotation 45 degrees [TWNoteComboBox6] : right lateral rotation 45 degrees

## 2023-07-13 NOTE — ASSESSMENT
[FreeTextEntry1] : After discussing various treatment options with the patient including but not limited to oral medications, physical therapy, exercise, modalities as well as interventional spinal injections, we have decided with the following plan:\par \par 1) Intervention Injection Therapy:\par I personally reviewed the MRI/CT scan images and agree with the radiologist's report. The radiological findings were discussed with the patient.\par The risks, benefits, contents and alternatives to injection were explained in full to the patient. Risks outlined include but are not limited to infection,sepsis, bleeding, post-dural puncture headache, nerve damage, temporary increase in pain, syncopal episode, failure to resolve symptoms, allergic reaction, symptom recurrence, and elevation of blood sugar in diabetics. Cortisone may cause immunosuppression. Patient understands the risks. All questions were answered. After discussion of options, patient requested an injection. Information regarding the injection was given to the patient. Which medications to stop prior to the injection was explained to the patient as well.\par \par Follow up in 1-2 weeks post injection for re-evaluation. \par Continue Home exercises, stretching, activity modification, physical therapy, and conservative care.\par \par Patient is presenting with acute/sub-acute radicular pain with impairment in ADLs and functionality.  The pain has not responded sufficiently to  conservative care including nsaid therapy and/or physical therapy.  There is no bleeding tendency, unstable medical condition, or systemic infection. The purpose of the spinal injections is to facilitate active therapy by providing short term relief through reduction of pain and inflammation. \par \par Injections, by themselves, are not likely to provide long-term relief. Rather, active rehabilitation with modified work achieves long-term relief by increasing active ROM, strength and stability. \par \par C7-T1 Cervical Epidural Steroid Injection under fluoroscopic guidance with image. Of note, the C7-T1 level has been determined to be the safest level to inject in the cervical spine as the epidural space is the largest. Despite pathology at different levels, studies have shown that the medication will spread up to the most cranial level, despite the level of injection.

## 2023-07-13 NOTE — HISTORY OF PRESENT ILLNESS
[Neck] : neck [5] : 5 [10] : 10 [Dull/Aching] : dull/aching [Sharp] : sharp [] : yes [Frequent] : frequent [Sleep] : sleep [Lying in bed] : lying in bed [FreeTextEntry1] : 07/12/2023(10 minutes late) : Patient presents for initial evaluation. She had a fall on 5/18/23 while getting coffee at [employer]. She slipped and fell on a wet floor. Since the fall her neck has gotten worse. Pain keeps her up at night. Pain is in the left axial neck. +n/t down the right arm. no weakness. Currently in PT, improvement of her arm but not the neck. 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 \par \par Subjective Weakness: No\par Numbness/Tingling: Yes\par Bladder/Bowel dysfunction: No\par Treatments Tried: PT, meds\par \par Attempted modalities for current pain complaint:\par See above:\par Medications: Yes- Flexeril PRN, NSAIDS PRN\par \par Injections: No \par \par Previous Spine Surgery: N/A\par \par Imaging:\par MRI Cervical Spine (5/26/23) -OC: C2-C3: Central herniation and annular fissure contacting but not compressing the anterior cord with no central  stenosis. The herniation is larger than on prior study. No foraminal stenosis.\par C3-C4: Broad bulge with subtle central herniation and no contact of the cord or central stenosis. No foraminal \par stenosis. This is stable from the prior study.\par C4-C5: Broad bulge with central herniation. No contact of the cord or central stenosis. Luschka hypertrophy \par and facet hypertrophy with inferior foraminal stenosis on the right. This is not significantly changed from the \par prior study.\par C5-C6: Broad bulge with no herniation or central stenosis. Luschka hypertrophy with mild right foraminal \par stenosis. The prior noted asymmetric to right herniation is not well appreciated on the current exam.\par C6-C7: Broad bulge with no central herniation or central stenosis. Broad asymmetric to right herniation\par extending into the right foramen with Luschka hypertrophy and moderate-to-severe right foraminal stenosis. \par This is not significantly changed from the prior study.\par C7-T1: No herniation, foraminal stenosis or central stenosis.\par Cord, no atrophy or enlargement. Craniocervical junction is normally positioned. Facets are aligned and \par ligaments are intact. Soft tissues are intact. No muscle tear, ligament tear or fluid collection.\par

## 2023-07-13 NOTE — REVIEW OF SYSTEMS
Impression: Macular cyst, hole, or pseudohole, left eye: H35.342. Patient has no significant visual complaints at this time. Denies distortion. Per OCT no ME. Plan: Discussed findings with patient. Observe. RTC 12 months for complete + possible OCT(Mac).  Sooner if changes in vision are observed [Negative] : Heme/Lymph

## 2023-07-14 ENCOUNTER — RX RENEWAL (OUTPATIENT)
Age: 48
End: 2023-07-14

## 2023-07-24 ENCOUNTER — APPOINTMENT (OUTPATIENT)
Dept: ORTHOPEDIC SURGERY | Facility: CLINIC | Age: 48
End: 2023-07-24
Payer: COMMERCIAL

## 2023-07-24 DIAGNOSIS — M23.91 UNSPECIFIED INTERNAL DERANGEMENT OF RIGHT KNEE: ICD-10-CM

## 2023-07-24 PROCEDURE — 99214 OFFICE O/P EST MOD 30 MIN: CPT

## 2023-07-24 NOTE — ASSESSMENT
[FreeTextEntry1] : \par \par - We discussed their diagnosis and treatment options at length including the risks and benefits of both surgical treatment with a knee replacement and non-surgical options.\par - We will continue conservative treatment with activity modification, PT, icing, weight loss, and anti-inflammatory medications.\par - The patient was provided with a PT prescription to work on ROM, hip ER/abductors strengthening, quad/hamstring stretches and strengthening, and other exercises \par - The patient was advised to let pain guide the gradual advancement of activities. \par - Naprosyn rx\par - Patient was given a prescription for an anti-inflammatory medication.  They will take it for the next week and then on an as needed basis, as long as there are no medical contra-indications.  Patient is counseled on possible GI, renal, and cardiovascular side effects. \par - since cont mechanicl symptos mri to eval for MMT\par - MRI to r/o MMT \par

## 2023-07-24 NOTE — HISTORY OF PRESENT ILLNESS
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

## 2023-07-24 NOTE — IMAGING

## 2023-07-27 ENCOUNTER — APPOINTMENT (OUTPATIENT)
Dept: ORTHOPEDIC SURGERY | Facility: CLINIC | Age: 48
End: 2023-07-27

## 2023-08-04 ENCOUNTER — APPOINTMENT (OUTPATIENT)
Age: 48
End: 2023-08-04
Payer: COMMERCIAL

## 2023-08-04 PROCEDURE — 62321 NJX INTERLAMINAR CRV/THRC: CPT

## 2023-08-07 ENCOUNTER — APPOINTMENT (OUTPATIENT)
Dept: ORTHOPEDIC SURGERY | Facility: CLINIC | Age: 48
End: 2023-08-07
Payer: COMMERCIAL

## 2023-08-07 VITALS — WEIGHT: 158 LBS | BODY MASS INDEX: 28 KG/M2 | HEIGHT: 63 IN

## 2023-08-07 DIAGNOSIS — M54.12 RADICULOPATHY, CERVICAL REGION: ICD-10-CM

## 2023-08-07 PROCEDURE — 99213 OFFICE O/P EST LOW 20 MIN: CPT

## 2023-08-07 NOTE — HISTORY OF PRESENT ILLNESS
[Neck] : neck [9] : 9 [de-identified] : 8/7/23: here for Follow up, Has started PT going twice weekly. Patient has [facility] on 8/4/23 with Dr. Hutchison, now reporting new left-hand numbness and right sided neck tightness. Site clean/dry.   06/09/2023: Patient presents today for a new consult visit of the C-spine. States she fell on 5.18.23. Was seeing Dr Arce who did xrays/mri. Had known history of cervical degen changers iwht HNP.  Was treated with PT and was doing well until this fall.  Today has pain in neck radiating down arm.  HAs not restarted PT for neck.  Tried  from [place] with no relief.  [] : Post Surgical Visit: no

## 2023-08-07 NOTE — IMAGING
[de-identified] : MRI cervical spine reviewed.  1. Straightening of lordosis. Diffuse loss of disc signal and height with anterior spurring and no fracture. This is \par  stable from the prior study.\par  2. C2-C3: Central herniation and annular fissure contacting but not compressing the anterior cord. The\par  herniation is larger than on prior study.\par  3. C3-C4: Broad bulge with subtle central herniation. This is stable from the prior study.\par  4. C4-C5: Broad bulge with central herniation. Luschka hypertrophy and facet hypertrophy with inferior \par  foraminal stenosis on the right. This is not significantly changed from the prior study.\par  5. C5-C6: Broad bulge. Luschka hypertrophy with mild right foraminal stenosis. The prior noted asymmetric to \par  right herniation is not well appreciated on the current exam.\par  6. C6-C7: Broad bulge. Broad asymmetric to right herniation extending into the right foramen with Luschka \par  hypertrophy and moderate-to-severe right foraminal stenosis. This is not significantly changed from the prior \par  study.

## 2023-08-07 NOTE — PHYSICAL EXAM
[] : paracervical tenderness [Flexion] : flexion [Rotation to right] : rotation to right [de-identified] : 4+/5 bilaterally

## 2023-08-17 ENCOUNTER — APPOINTMENT (OUTPATIENT)
Dept: PAIN MANAGEMENT | Facility: CLINIC | Age: 48
End: 2023-08-17
Payer: COMMERCIAL

## 2023-08-17 ENCOUNTER — APPOINTMENT (OUTPATIENT)
Dept: MRI IMAGING | Facility: CLINIC | Age: 48
End: 2023-08-17
Payer: COMMERCIAL

## 2023-08-17 VITALS — WEIGHT: 158 LBS | HEIGHT: 63 IN | BODY MASS INDEX: 28 KG/M2

## 2023-08-17 PROCEDURE — 73721 MRI JNT OF LWR EXTRE W/O DYE: CPT | Mod: RT

## 2023-08-17 PROCEDURE — 99214 OFFICE O/P EST MOD 30 MIN: CPT

## 2023-08-17 NOTE — REASON FOR VISIT
[Initial Consultation] : an initial pain management consultation [Follow-Up Visit] : a follow-up pain management visit [FreeTextEntry2] : pt is here for f/u to inj

## 2023-08-17 NOTE — HISTORY OF PRESENT ILLNESS
To get better and follow your care plan as instructed. [Neck] : neck [5] : 5 [10] : 10 [Dull/Aching] : dull/aching [Sharp] : sharp [Frequent] : frequent [Sleep] : sleep [Lying in bed] : lying in bed [Radiating] : radiating [FreeTextEntry1] : 08/17/2023: follow up today for NUHA on 8/4 patient states jordan has numbness on the right arm and tingling.  Had 80% relief.    07/12/2023(10 minutes late) : Patient presents for initial evaluation. She had a fall on 5/18/23 while getting coffee at [employer]. She slipped and fell on a wet floor. Since the fall her neck has gotten worse. Pain keeps her up at night. Pain is in the left axial neck. +n/t down the right arm. no weakness. Currently in PT, improvement of her arm but not the neck.   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   Subjective Weakness: No Numbness/Tingling: Yes Bladder/Bowel dysfunction: No Treatments Tried: PT, meds  Attempted modalities for current pain complaint: See above: Medications: Yes- Flexeril PRN, NSAIDS PRN  Injections: No  8/4/23- N[facility]HA Previous Spine Surgery: N/A  Imaging: MRI Cervical Spine (5/26/23) -OC: C2-C3: Central herniation and annular fissure contacting but not compressing the anterior cord with no central  stenosis. The herniation is larger than on prior study. No foraminal stenosis. C3-C4: Broad bulge with subtle central herniation and no contact of the cord or central stenosis. No foraminal  stenosis. This is stable from the prior study. C4-C5: Broad bulge with central herniation. No contact of the cord or central stenosis. Luschka hypertrophy  and facet hypertrophy with inferior foraminal stenosis on the right. This is not significantly changed from the  prior study. C5-C6: Broad bulge with no herniation or central stenosis. Luschka hypertrophy with mild right foraminal  stenosis. The prior noted asymmetric to right herniation is not well appreciated on the current exam. C6-C7: Broad bulge with no central herniation or central stenosis. Broad asymmetric to right herniation extending into the right foramen with Luschka hypertrophy and moderate-to-severe right foraminal stenosis.  This is not significantly changed from the prior study. C7-T1: No herniation, foraminal stenosis or central stenosis. Cord, no atrophy or enlargement. Craniocervical junction is normally positioned. Facets are aligned and  ligaments are intact. Soft tissues are intact. No muscle tear, ligament tear or fluid collection.   [] : no [FreeTextEntry7] : right arm  [TWNoteComboBox1] : 70%

## 2023-08-17 NOTE — ASSESSMENT
[FreeTextEntry1] : After discussing various treatment options with the patient including but not limited to oral medications, physical therapy, exercise, modalities as well as interventional spinal injections, we have decided with the following plan:  1) Intervention Injection Therapy: I personally reviewed the MRI/CT scan images and agree with the radiologist's report. The radiological findings were discussed with the patient. The risks, benefits, contents and alternatives to injection were explained in full to the patient. Risks outlined include but are not limited to infection,sepsis, bleeding, post-dural puncture headache, nerve damage, temporary increase in pain, syncopal episode, failure to resolve symptoms, allergic reaction, symptom recurrence, and elevation of blood sugar in diabetics. Cortisone may cause immunosuppression. Patient understands the risks. All questions were answered. After discussion of options, patient requested an injection. Information regarding the injection was given to the patient. Which medications to stop prior to the injection was explained to the patient as well.  Follow up in 1-2 weeks post injection for re-evaluation.  Continue Home exercises, stretching, activity modification, physical therapy, and conservative care.  Patient is presenting with acute/sub-acute radicular pain with impairment in ADLs and functionality.  The pain has not responded sufficiently to  conservative care including nsaid therapy and/or physical therapy.  There is no bleeding tendency, unstable medical condition, or systemic infection. The purpose of the spinal injections is to facilitate active therapy by providing short term relief through reduction of pain and inflammation.   Injections, by themselves, are not likely to provide long-term relief. Rather, active rehabilitation with modified work achieves long-term relief by increasing active ROM, strength and stability.   C7-T1 Cervical Epidural Steroid Injection under fluoroscopic guidance with image. Of note, the C7-T1 level has been determined to be the safest level to inject in the cervical spine as the epidural space is the largest. Despite pathology at different levels, studies have shown that the medication will spread up to the most cranial level, despite the level of injection. - will call

## 2023-08-17 NOTE — PHYSICAL EXAM
[Rotation to left] : rotation to left [Rotation to right] : rotation to right [4___] : right deltoid  4[unfilled]/5 [] : no palpable masses [FreeTextEntry8] : right worse left  [de-identified] : left lateral rotation 45 degrees [TWNoteComboBox6] : right lateral rotation 45 degrees

## 2023-08-21 ENCOUNTER — APPOINTMENT (OUTPATIENT)
Dept: ORTHOPEDIC SURGERY | Facility: CLINIC | Age: 48
End: 2023-08-21

## 2023-08-24 ENCOUNTER — APPOINTMENT (OUTPATIENT)
Dept: ORTHOPEDIC SURGERY | Facility: CLINIC | Age: 48
End: 2023-08-24

## 2023-08-31 ENCOUNTER — APPOINTMENT (OUTPATIENT)
Dept: ORTHOPEDIC SURGERY | Facility: CLINIC | Age: 48
End: 2023-08-31
Payer: COMMERCIAL

## 2023-08-31 DIAGNOSIS — M17.11 UNILATERAL PRIMARY OSTEOARTHRITIS, RIGHT KNEE: ICD-10-CM

## 2023-08-31 DIAGNOSIS — R22.41 LOCALIZED SWELLING, MASS AND LUMP, RIGHT LOWER LIMB: ICD-10-CM

## 2023-08-31 DIAGNOSIS — M65.9 SYNOVITIS AND TENOSYNOVITIS, UNSPECIFIED: ICD-10-CM

## 2023-08-31 PROCEDURE — 99213 OFFICE O/P EST LOW 20 MIN: CPT

## 2023-08-31 NOTE — IMAGING
[de-identified] : RIGHT KNEE Inspection:  mild effusion Palpation: medial joint line tenderness, anterior tenderness Knee Range of Motion:  3-125  Strength: 5/5 Quadriceps strength, 5/5 Hamstring strength Neurological: light touch is intact throughout Ligament Stability and Special Tests:  McMurrays: neg Lachman: neg Pivot Shift: neg Posterior Drawer: neg Valgus: neg Varus: neg Patella Apprehension: neg Patella Maltracking: neg

## 2023-08-31 NOTE — ASSESSMENT
[FreeTextEntry1] : mri right knee 8/17/23 - mult sprain, synov, eff, c/w poss inflam arthropathy  **incidentally seen medial gadtroc abnormalities / lesion approx 3cm  but extends distal poss calc hematoma vs gas densities - pt aware and says she had calf implant when younger and thats what its from    - The patient was advised of the diagnosis.  The natural history of the pathology was explained to the patient in layman's terms.  Several different treatment options were discussed and explained including the risks and benefits of both surgical and non-surgical treatments.  All questions and concerns were answered. - We will continue conservative treatment with PT, icing, and anti-inflammatory medications. - The patient was provided with a prescription for Physical Therapy. - The patient was advised to let pain guide the gradual advancement of activities. - rheum w/u for inflam arthropathy  **incidentally seen medial gadtroc abnormalities / lesion approx 3cm  but extends distal poss calc hematoma vs gas densities - pt aware and says she had calf implant when younger and thats what its from

## 2023-08-31 NOTE — HISTORY OF PRESENT ILLNESS
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 Has tried ice, Tylenol h/o calf implant  7/24/23 - doing Pt at [facility] PT and [facility], still pain, had CSI (6/14) 8/31/23 - had mri knee, using nsaids, cont with HEP

## 2023-09-11 ENCOUNTER — APPOINTMENT (OUTPATIENT)
Dept: ORTHOPEDIC SURGERY | Facility: CLINIC | Age: 48
End: 2023-09-11

## 2023-10-02 ENCOUNTER — NON-APPOINTMENT (OUTPATIENT)
Age: 48
End: 2023-10-02

## 2023-10-04 ENCOUNTER — APPOINTMENT (OUTPATIENT)
Dept: OBGYN | Facility: CLINIC | Age: 48
End: 2023-10-04
Payer: COMMERCIAL

## 2023-10-04 DIAGNOSIS — N93.8 OTHER SPECIFIED ABNORMAL UTERINE AND VAGINAL BLEEDING: ICD-10-CM

## 2023-10-04 PROCEDURE — 99213 OFFICE O/P EST LOW 20 MIN: CPT

## 2023-10-23 ENCOUNTER — APPOINTMENT (OUTPATIENT)
Dept: OBGYN | Facility: CLINIC | Age: 48
End: 2023-10-23
Payer: COMMERCIAL

## 2023-10-23 VITALS
WEIGHT: 154 LBS | BODY MASS INDEX: 27.29 KG/M2 | DIASTOLIC BLOOD PRESSURE: 74 MMHG | HEIGHT: 63 IN | SYSTOLIC BLOOD PRESSURE: 120 MMHG

## 2023-10-23 DIAGNOSIS — Z72.0 TOBACCO USE: ICD-10-CM

## 2023-10-23 DIAGNOSIS — D25.0 SUBMUCOUS LEIOMYOMA OF UTERUS: ICD-10-CM

## 2023-10-23 DIAGNOSIS — Z01.411 ENCOUNTER FOR GYNECOLOGICAL EXAMINATION (GENERAL) (ROUTINE) WITH ABNORMAL FINDINGS: ICD-10-CM

## 2023-10-23 PROCEDURE — 99396 PREV VISIT EST AGE 40-64: CPT

## 2023-10-23 RX ORDER — METHYLPREDNISOLONE 4 MG/1
4 TABLET ORAL
Qty: 1 | Refills: 0 | Status: DISCONTINUED | COMMUNITY
Start: 2022-10-17 | End: 2023-10-23

## 2023-10-23 RX ORDER — IBUPROFEN 800 MG/1
800 TABLET, FILM COATED ORAL
Qty: 90 | Refills: 0 | Status: DISCONTINUED | COMMUNITY
Start: 2023-06-14 | End: 2023-10-23

## 2023-10-23 RX ORDER — NAPROXEN 500 MG/1
500 TABLET ORAL TWICE DAILY
Qty: 60 | Refills: 0 | Status: DISCONTINUED | COMMUNITY
Start: 2023-07-24 | End: 2023-10-23

## 2023-10-23 RX ORDER — METHYLPREDNISOLONE 4 MG/1
4 TABLET ORAL
Qty: 1 | Refills: 0 | Status: DISCONTINUED | COMMUNITY
Start: 2023-05-23 | End: 2023-10-23

## 2023-10-30 ENCOUNTER — APPOINTMENT (OUTPATIENT)
Dept: ORTHOPEDIC SURGERY | Facility: CLINIC | Age: 48
End: 2023-10-30
Payer: COMMERCIAL

## 2023-10-30 VITALS — WEIGHT: 154 LBS | BODY MASS INDEX: 27.29 KG/M2 | HEIGHT: 63 IN

## 2023-10-30 DIAGNOSIS — M54.50 LOW BACK PAIN, UNSPECIFIED: ICD-10-CM

## 2023-10-30 PROCEDURE — 99213 OFFICE O/P EST LOW 20 MIN: CPT

## 2023-11-03 DIAGNOSIS — N89.8 OTHER SPECIFIED NONINFLAMMATORY DISORDERS OF VAGINA: ICD-10-CM

## 2023-11-05 PROBLEM — Z72.0 CURRENT OCCASIONAL SMOKER: Status: ACTIVE | Noted: 2023-10-23

## 2023-11-05 RX ORDER — TINIDAZOLE 500 MG/1
500 TABLET, FILM COATED ORAL DAILY
Qty: 8 | Refills: 0 | Status: ACTIVE | COMMUNITY
Start: 2023-11-03 | End: 1900-01-01

## 2023-12-14 ENCOUNTER — APPOINTMENT (OUTPATIENT)
Dept: ORTHOPEDIC SURGERY | Facility: AMBULATORY SURGERY CENTER | Age: 48
End: 2023-12-14
Payer: COMMERCIAL

## 2023-12-14 PROCEDURE — 62321 NJX INTERLAMINAR CRV/THRC: CPT

## 2024-01-08 ENCOUNTER — APPOINTMENT (OUTPATIENT)
Dept: ORTHOPEDIC SURGERY | Facility: CLINIC | Age: 49
End: 2024-01-08

## 2024-01-11 ENCOUNTER — APPOINTMENT (OUTPATIENT)
Dept: PAIN MANAGEMENT | Facility: CLINIC | Age: 49
End: 2024-01-11
Payer: COMMERCIAL

## 2024-01-11 VITALS — WEIGHT: 159 LBS | HEIGHT: 63 IN | BODY MASS INDEX: 28.17 KG/M2

## 2024-01-11 PROCEDURE — 99213 OFFICE O/P EST LOW 20 MIN: CPT

## 2024-01-11 NOTE — HISTORY OF PRESENT ILLNESS
[Neck] : neck [10] : 10 [Dull/Aching] : dull/aching [Radiating] : radiating [Sharp] : sharp [Sleep] : sleep [Lying in bed] : lying in bed [8] : 8 [Intermittent] : intermittent [Leisure] : leisure [Nothing helps with pain getting better] : Nothing helps with pain getting better [FreeTextEntry1] : 01/11/2024: follow up today for NUHA on 12/14 with no relief. Now has more numbness and pain than before injection.   Will try cymbalta  08/17/2023: follow up today for NUHA on 8/4 patient states still has numbness on the right arm and tingling.  Had 80% relief.    07/12/2023(10 minutes late) : Patient presents for initial evaluation. She had a fall on 5/18/23 while getting coffee at [employer]. She slipped and fell on a wet floor. Since the fall her neck has gotten worse. Pain keeps her up at night. Pain is in the left axial neck. +n/t down the right arm. no weakness. Currently in PT, improvement of her arm but not the neck.   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   Subjective Weakness: No Numbness/Tingling: Yes Bladder/Bowel dysfunction: No Treatments Tried: PT, meds  Attempted modalities for current pain complaint: See above: Medications: Yes- Flexeril PRN, NSAIDS PRN  Injections: No  8/4/23, 12/14/23- NUHA Previous Spine Surgery: N/A  Imaging: MRI Cervical Spine (5/26/23) -OC: C2-C3: Central herniation and annular fissure contacting but not compressing the anterior cord with no central  stenosis. The herniation is larger than on prior study. No foraminal stenosis. C3-C4: Broad bulge with subtle central herniation and no contact of the cord or central stenosis. No foraminal  stenosis. This is stable from the prior study. C4-C5: Broad bulge with central herniation. No contact of the cord or central stenosis. Luschka hypertrophy  and facet hypertrophy with inferior foraminal stenosis on the right. This is not significantly changed from the  prior study. C5-C6: Broad bulge with no herniation or central stenosis. Luschka hypertrophy with mild right foraminal  stenosis. The prior noted asymmetric to right herniation is not well appreciated on the current exam. C6-C7: Broad bulge with no central herniation or central stenosis. Broad asymmetric to right herniation extending into the right foramen with Luschka hypertrophy and moderate-to-severe right foraminal stenosis.  This is not significantly changed from the prior study. C7-T1: No herniation, foraminal stenosis or central stenosis. Cord, no atrophy or enlargement. Craniocervical junction is normally positioned. Facets are aligned and  ligaments are intact. Soft tissues are intact. No muscle tear, ligament tear or fluid collection.   [] : no [FreeTextEntry6] : numbness, cracking, stiff  [FreeTextEntry7] : right arm, b/l hands- numb [FreeTextEntry9] : icy hot

## 2024-01-11 NOTE — PHYSICAL EXAM
[Rotation to left] : rotation to left [Rotation to right] : rotation to right [4___] : right deltoid  4[unfilled]/5 [] : no palpable masses [FreeTextEntry8] : right worse left  [de-identified] : left lateral rotation 45 degrees [TWNoteComboBox6] : right lateral rotation 45 degrees

## 2024-02-08 ENCOUNTER — APPOINTMENT (OUTPATIENT)
Dept: PAIN MANAGEMENT | Facility: CLINIC | Age: 49
End: 2024-02-08
Payer: COMMERCIAL

## 2024-02-08 VITALS — BODY MASS INDEX: 28.35 KG/M2 | WEIGHT: 160 LBS | HEIGHT: 63 IN

## 2024-02-08 DIAGNOSIS — M50.90 CERVICAL DISC DISORDER, UNSPECIFIED, UNSPECIFIED CERVICAL REGION: ICD-10-CM

## 2024-02-08 PROCEDURE — 99213 OFFICE O/P EST LOW 20 MIN: CPT

## 2024-02-08 RX ORDER — DULOXETINE HYDROCHLORIDE 30 MG/1
30 CAPSULE, DELAYED RELEASE PELLETS ORAL TWICE DAILY
Qty: 180 | Refills: 0 | Status: ACTIVE | COMMUNITY
Start: 2024-01-11 | End: 1900-01-01

## 2024-02-08 NOTE — HISTORY OF PRESENT ILLNESS
[Neck] : neck [8] : 8 [10] : 10 [Dull/Aching] : dull/aching [Radiating] : radiating [Sharp] : sharp [Intermittent] : intermittent [Leisure] : leisure [Sleep] : sleep [Nothing helps with pain getting better] : Nothing helps with pain getting better [Lying in bed] : lying in bed [5] : 5 [4] : 4 [Injection therapy] : injection therapy [Meds] : meds [FreeTextEntry1] : 02/08/2024: follow up today   01/11/2024: follow up today for [facility] on 12/14 with no relief. Now has more numbness and pain than before injection.   Will try cymbalta  08/17/2023: follow up today for [facility] on 8/4 patient states still has numbness on the right arm and tingling.  Had 80% relief.    07/12/2023(10 minutes late) : Patient presents for initial evaluation. She had a fall on 5/18/23 while getting coffee at [employer]. She slipped and fell on a wet floor. Since the fall her neck has gotten worse. Pain keeps her up at night. Pain is in the left axial neck. +n/t down the right arm. no weakness. Currently in PT, improvement of her arm but not the neck.   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   Subjective Weakness: No Numbness/Tingling: Yes Bladder/Bowel dysfunction: No Treatments Tried: PT, meds  Attempted modalities for current pain complaint: See above: Medications: Yes- Flexeril PRN, NSAIDS PRN  Injections: No  8/4/23, 12/14/23- [facility] Previous Spine Surgery: N/A  Imaging: MRI Cervical Spine (5/26/23) -OC: C2-C3: Central herniation and annular fissure contacting but not compressing the anterior cord with no central  stenosis. The herniation is larger than on prior study. No foraminal stenosis. C3-C4: Broad bulge with subtle central herniation and no contact of the cord or central stenosis. No foraminal  stenosis. This is stable from the prior study. C4-C5: Broad bulge with central herniation. No contact of the cord or central stenosis. Luschka hypertrophy  and facet hypertrophy with inferior foraminal stenosis on the right. This is not significantly changed from the  prior study. C5-C6: Broad bulge with no herniation or central stenosis. Luschka hypertrophy with mild right foraminal  stenosis. The prior noted asymmetric to right herniation is not well appreciated on the current exam. C6-C7: Broad bulge with no central herniation or central stenosis. Broad asymmetric to right herniation extending into the right foramen with Luschka hypertrophy and moderate-to-severe right foraminal stenosis.  This is not significantly changed from the prior study. C7-T1: No herniation, foraminal stenosis or central stenosis. Cord, no atrophy or enlargement. Craniocervical junction is normally positioned. Facets are aligned and  ligaments are intact. Soft tissues are intact. No muscle tear, ligament tear or fluid collection.   [] : no [FreeTextEntry6] :  stiff  [FreeTextEntry7] : right arm, b/l hands- numb [FreeTextEntry9] : icy hot

## 2024-02-08 NOTE — PHYSICAL EXAM
[Rotation to left] : rotation to left [Rotation to right] : rotation to right [4___] : right deltoid  4[unfilled]/5 [] : no palpable masses [FreeTextEntry8] : right worse left  [de-identified] : left lateral rotation 45 degrees [TWNoteComboBox6] : right lateral rotation 45 degrees

## 2024-04-11 ENCOUNTER — APPOINTMENT (OUTPATIENT)
Dept: PAIN MANAGEMENT | Facility: CLINIC | Age: 49
End: 2024-04-11

## 2024-12-16 ENCOUNTER — APPOINTMENT (OUTPATIENT)
Dept: ORTHOPEDIC SURGERY | Facility: CLINIC | Age: 49
End: 2024-12-16
Payer: COMMERCIAL

## 2024-12-16 VITALS — BODY MASS INDEX: 28.35 KG/M2 | WEIGHT: 160 LBS | HEIGHT: 63 IN

## 2024-12-16 DIAGNOSIS — G56.01 CARPAL TUNNEL SYNDROME, RIGHT UPPER LIMB: ICD-10-CM

## 2024-12-16 PROCEDURE — 72050 X-RAY EXAM NECK SPINE 4/5VWS: CPT

## 2024-12-16 PROCEDURE — 99213 OFFICE O/P EST LOW 20 MIN: CPT | Mod: 25

## 2024-12-16 RX ORDER — METHYLPREDNISOLONE 4 MG/1
4 TABLET ORAL
Qty: 1 | Refills: 0 | Status: ACTIVE | COMMUNITY
Start: 2024-12-16 | End: 1900-01-01

## 2024-12-23 ENCOUNTER — APPOINTMENT (OUTPATIENT)
Dept: MRI IMAGING | Facility: CLINIC | Age: 49
End: 2024-12-23

## 2024-12-23 ENCOUNTER — APPOINTMENT (OUTPATIENT)
Dept: NEUROLOGY | Facility: CLINIC | Age: 49
End: 2024-12-23

## 2024-12-30 ENCOUNTER — APPOINTMENT (OUTPATIENT)
Dept: MRI IMAGING | Facility: CLINIC | Age: 49
End: 2024-12-30
Payer: COMMERCIAL

## 2024-12-30 ENCOUNTER — APPOINTMENT (OUTPATIENT)
Dept: ORTHOPEDIC SURGERY | Facility: CLINIC | Age: 49
End: 2024-12-30

## 2024-12-30 PROCEDURE — 72141 MRI NECK SPINE W/O DYE: CPT

## 2025-01-09 ENCOUNTER — APPOINTMENT (OUTPATIENT)
Dept: NEUROLOGY | Facility: CLINIC | Age: 50
End: 2025-01-09
Payer: COMMERCIAL

## 2025-01-09 PROCEDURE — 95886 MUSC TEST DONE W/N TEST COMP: CPT

## 2025-01-09 PROCEDURE — 95885 MUSC TST DONE W/NERV TST LIM: CPT | Mod: 59,LT

## 2025-01-09 PROCEDURE — 95909 NRV CNDJ TST 5-6 STUDIES: CPT

## 2025-01-13 ENCOUNTER — APPOINTMENT (OUTPATIENT)
Dept: ORTHOPEDIC SURGERY | Facility: CLINIC | Age: 50
End: 2025-01-13
Payer: COMMERCIAL

## 2025-01-13 VITALS — BODY MASS INDEX: 28.35 KG/M2 | HEIGHT: 63 IN | WEIGHT: 160 LBS

## 2025-01-13 DIAGNOSIS — M50.90 CERVICAL DISC DISORDER, UNSPECIFIED, UNSPECIFIED CERVICAL REGION: ICD-10-CM

## 2025-01-13 PROCEDURE — 99213 OFFICE O/P EST LOW 20 MIN: CPT

## 2025-02-24 ENCOUNTER — APPOINTMENT (OUTPATIENT)
Dept: ORTHOPEDIC SURGERY | Facility: CLINIC | Age: 50
End: 2025-02-24

## 2025-03-26 ENCOUNTER — APPOINTMENT (OUTPATIENT)
Dept: OBGYN | Facility: CLINIC | Age: 50
End: 2025-03-26
Payer: COMMERCIAL

## 2025-03-26 VITALS
DIASTOLIC BLOOD PRESSURE: 80 MMHG | BODY MASS INDEX: 28.17 KG/M2 | SYSTOLIC BLOOD PRESSURE: 124 MMHG | HEIGHT: 63 IN | WEIGHT: 159 LBS

## 2025-03-26 DIAGNOSIS — D25.1 INTRAMURAL LEIOMYOMA OF UTERUS: ICD-10-CM

## 2025-03-26 DIAGNOSIS — Z01.411 ENCOUNTER FOR GYNECOLOGICAL EXAMINATION (GENERAL) (ROUTINE) WITH ABNORMAL FINDINGS: ICD-10-CM

## 2025-03-26 DIAGNOSIS — D25.2 INTRAMURAL LEIOMYOMA OF UTERUS: ICD-10-CM

## 2025-03-26 DIAGNOSIS — D25.0 INTRAMURAL LEIOMYOMA OF UTERUS: ICD-10-CM

## 2025-03-26 PROCEDURE — 99396 PREV VISIT EST AGE 40-64: CPT

## 2025-03-29 RX ORDER — AMLODIPINE BESYLATE 10 MG/1
10 TABLET ORAL
Refills: 0 | Status: ACTIVE | COMMUNITY